# Patient Record
Sex: FEMALE | URBAN - METROPOLITAN AREA
[De-identification: names, ages, dates, MRNs, and addresses within clinical notes are randomized per-mention and may not be internally consistent; named-entity substitution may affect disease eponyms.]

---

## 2020-05-27 ENCOUNTER — APPOINTMENT (OUTPATIENT)
Dept: URBAN - METROPOLITAN AREA CLINIC 184 | Age: 85
Setting detail: DERMATOLOGY
End: 2020-05-27

## 2020-05-27 PROBLEM — C44.311 BASAL CELL CARCINOMA OF SKIN OF NOSE: Status: ACTIVE | Noted: 2020-05-27

## 2020-05-27 PROCEDURE — 17311 MOHS 1 STAGE H/N/HF/G: CPT

## 2020-05-27 PROCEDURE — OTHER RETURN TO REFERRING PROVIDER: OTHER

## 2020-05-27 PROCEDURE — OTHER CONSULTATION FOR MOHS SURGERY: OTHER

## 2020-05-27 PROCEDURE — 17312 MOHS ADDL STAGE: CPT

## 2020-05-27 PROCEDURE — 15260 FTH/GFT FR N/E/E/L 20 SQCM/<: CPT

## 2020-05-27 PROCEDURE — OTHER MOHS SURGERY: OTHER

## 2020-05-27 NOTE — HPI: MOHS SURGERY CONSULTATION
Has The Cancer Been Biopsied Before?: has been previously biopsied
Who Is Your Referring Provider?: Dr. Kim
When Was Your Biopsy?: 10/24/2019

## 2020-05-27 NOTE — PROCEDURE: MOHS SURGERY
Call made to obtain labs, spoke with Dr. Kenyon medical assistant   Split-Thickness Skin Graft Text: The defect edges were debeveled with a #15 scalpel blade.  Given the location of the defect, shape of the defect and the proximity to free margins a split thickness skin graft was deemed most appropriate.  Using a sterile surgical marker, the primary defect shape was transferred to the donor site. The split thickness graft was then harvested.  The skin graft was then placed in the primary defect and oriented appropriately.

## 2020-05-27 NOTE — PROCEDURE: CONSULTATION FOR MOHS SURGERY
Detail Level: Detailed
Anatomic Location From Referring Provider: left dorsum nose
X Size Of Lesion In Cm (Optional): 0
Incorporate Mauc In Note: Yes

## 2022-07-29 NOTE — PROCEDURE: MOHS SURGERY
Winlevi Counseling:  I discussed with the patient the risks of topical clascoterone including but not limited to erythema, scaling, itching, and stinging. Patient voiced their understanding. Tarsorrhaphy Text: A tarsorrhaphy was performed using Frost sutures.